# Patient Record
Sex: MALE | Race: WHITE
[De-identification: names, ages, dates, MRNs, and addresses within clinical notes are randomized per-mention and may not be internally consistent; named-entity substitution may affect disease eponyms.]

---

## 2017-06-15 ENCOUNTER — HOSPITAL ENCOUNTER (EMERGENCY)
Dept: HOSPITAL 26 - MED | Age: 42
Discharge: HOME | End: 2017-06-15
Payer: MEDICAID

## 2017-06-15 VITALS — DIASTOLIC BLOOD PRESSURE: 73 MMHG | SYSTOLIC BLOOD PRESSURE: 123 MMHG

## 2017-06-15 VITALS — HEIGHT: 65 IN | BODY MASS INDEX: 26.66 KG/M2 | WEIGHT: 160 LBS

## 2017-06-15 VITALS — SYSTOLIC BLOOD PRESSURE: 119 MMHG | DIASTOLIC BLOOD PRESSURE: 71 MMHG
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
## 2017-06-15 DIAGNOSIS — S39.012A: Primary | ICD-10-CM
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
## 2017-06-15 DIAGNOSIS — S80.02XA: ICD-10-CM
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
## 2017-06-15 NOTE — NUR
Patient discharged with v/s stable. Written and verbal after care instructions 
given and explained. 

Patient alert, oriented and verbalized understanding of instructions. 
Ambulatory with steady gait. All questions addressed prior to discharge. ID 
band removed. Patient advised to follow up with PMD. Rx of MOTRIN& ROBAXIN 
given. Patient educated on indication of medication including possible reaction 
and side effects. Opportunity to ask questions provided and answered.
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
## 2019-01-27 ENCOUNTER — HOSPITAL ENCOUNTER (EMERGENCY)
Dept: HOSPITAL 26 - MED | Age: 44
Discharge: HOME | End: 2019-01-27
Payer: MEDICAID

## 2019-01-27 VITALS — SYSTOLIC BLOOD PRESSURE: 129 MMHG | DIASTOLIC BLOOD PRESSURE: 84 MMHG

## 2019-01-27 VITALS — SYSTOLIC BLOOD PRESSURE: 131 MMHG | DIASTOLIC BLOOD PRESSURE: 90 MMHG

## 2019-01-27 VITALS — HEIGHT: 64 IN | WEIGHT: 156 LBS | BODY MASS INDEX: 26.63 KG/M2

## 2019-01-27 DIAGNOSIS — S50.02XA: Primary | ICD-10-CM
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
## 2019-01-27 DIAGNOSIS — Z79.1: ICD-10-CM

## 2019-01-27 PROCEDURE — 73080 X-RAY EXAM OF ELBOW: CPT

## 2019-01-27 PROCEDURE — 99283 EMERGENCY DEPT VISIT LOW MDM: CPT

## 2019-01-27 PROCEDURE — 96372 THER/PROPH/DIAG INJ SC/IM: CPT
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
## 2019-01-27 NOTE — NUR
Patient discharged with v/s stable. Written and verbal after care instructions 
given and explained. 

Patient alert, oriented and verbalized understanding of instructions. 
Ambulatory with steady gait. All questions addressed prior to discharge. ID 
band removed. Patient advised to follow up with PMD. Rx of Prednisone 50mg and 
Naprosyn 500mg given. Patient educated on indication of medication including 
possible reaction and side effects. Opportunity to ask questions provided and 
answered.

## 2020-01-27 ENCOUNTER — HOSPITAL ENCOUNTER (EMERGENCY)
Dept: HOSPITAL 26 - MED | Age: 45
Discharge: HOME | End: 2020-01-27
Payer: MEDICAID

## 2020-01-27 VITALS — DIASTOLIC BLOOD PRESSURE: 82 MMHG | SYSTOLIC BLOOD PRESSURE: 138 MMHG

## 2020-01-27 VITALS — BODY MASS INDEX: 23.9 KG/M2 | HEIGHT: 64 IN | WEIGHT: 140 LBS

## 2020-01-27 VITALS — SYSTOLIC BLOOD PRESSURE: 136 MMHG | DIASTOLIC BLOOD PRESSURE: 95 MMHG
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
## 2020-01-27 DIAGNOSIS — S05.12XA: Primary | ICD-10-CM
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
## 2020-01-27 DIAGNOSIS — H11.32: ICD-10-CM

## 2020-01-27 DIAGNOSIS — Z79.899: ICD-10-CM
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
## 2020-01-27 NOTE — NUR
Patient discharged with v/s stable. Pt states 2/10 tollerable pain. Written and 
verbal after care instructions given and explained. Patient alert, oriented and 
verbalized understanding of instructions. Ambulatory with steady gait. All 
questions addressed prior to discharge. ID band removed. Patient advised to 
follow up with PMD and when to return to ER. Rx of Ibuprofen given. Patient 
educated on indication of medication including possible reaction and side 
effects. Opportunity to ask questions provided and answered.
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
## 2020-09-05 ENCOUNTER — HOSPITAL ENCOUNTER (EMERGENCY)
Dept: HOSPITAL 26 - MED | Age: 45
Discharge: HOME | End: 2020-09-05
Payer: MEDICAID

## 2020-09-05 VITALS — DIASTOLIC BLOOD PRESSURE: 84 MMHG | SYSTOLIC BLOOD PRESSURE: 140 MMHG

## 2020-09-05 VITALS — WEIGHT: 160 LBS | HEIGHT: 62 IN | BODY MASS INDEX: 29.44 KG/M2

## 2020-09-05 DIAGNOSIS — J18.9: Primary | ICD-10-CM

## 2020-09-05 DIAGNOSIS — M79.10: ICD-10-CM

## 2020-09-05 DIAGNOSIS — Z79.899: ICD-10-CM

## 2020-09-05 LAB
ALBUMIN FLD-MCNC: 3 G/DL (ref 3.4–5)
ANION GAP SERPL CALCULATED.3IONS-SCNC: 14.5 MMOL/L (ref 8–16)
AST SERPL-CCNC: 12 U/L (ref 15–37)
BASOPHILS # BLD AUTO: 0 K/UL (ref 0–0.22)
BASOPHILS NFR BLD AUTO: 0.3 % (ref 0–2)
BILIRUB SERPL-MCNC: 0.7 MG/DL (ref 0–1)
BUN SERPL-MCNC: 15 MG/DL (ref 7–18)
CHLORIDE SERPL-SCNC: 106 MMOL/L (ref 98–107)
CO2 SERPL-SCNC: 24.9 MMOL/L (ref 21–32)
CREAT SERPL-MCNC: 0.7 MG/DL (ref 0.6–1.3)
EOSINOPHIL # BLD AUTO: 0.1 K/UL (ref 0–0.4)
EOSINOPHIL NFR BLD AUTO: 1.3 % (ref 0–4)
ERYTHROCYTE [DISTWIDTH] IN BLOOD BY AUTOMATED COUNT: 14.7 % (ref 11.6–13.7)
GFR SERPL CREATININE-BSD FRML MDRD: 156 ML/MIN (ref 90–?)
GLUCOSE SERPL-MCNC: 96 MG/DL (ref 74–106)
HCT VFR BLD AUTO: 36.4 % (ref 36–52)
HGB BLD-MCNC: 11.9 G/DL (ref 12–18)
LYMPHOCYTES # BLD AUTO: 1.2 K/UL (ref 2–11.5)
LYMPHOCYTES NFR BLD AUTO: 10.1 % (ref 20.5–51.1)
MCH RBC QN AUTO: 28 PG (ref 27–31)
MCHC RBC AUTO-ENTMCNC: 33 G/DL (ref 33–37)
MCV RBC AUTO: 87.3 FL (ref 80–94)
MONOCYTES # BLD AUTO: 0.9 K/UL (ref 0.8–1)
MONOCYTES NFR BLD AUTO: 7.6 % (ref 1.7–9.3)
NEUTROPHILS # BLD AUTO: 9.5 K/UL (ref 1.8–7.7)
NEUTROPHILS NFR BLD AUTO: 80.7 % (ref 42.2–75.2)
PLATELET # BLD AUTO: 280 K/UL (ref 140–450)
POTASSIUM SERPL-SCNC: 4.4 MMOL/L (ref 3.5–5.1)
RBC # BLD AUTO: 4.17 MIL/UL (ref 4.2–6.1)
SODIUM SERPL-SCNC: 141 MMOL/L (ref 136–145)
WBC # BLD AUTO: 11.7 K/UL (ref 4.8–10.8)

## 2020-09-05 PROCEDURE — 96361 HYDRATE IV INFUSION ADD-ON: CPT

## 2020-09-05 PROCEDURE — 96374 THER/PROPH/DIAG INJ IV PUSH: CPT

## 2020-09-05 PROCEDURE — 36415 COLL VENOUS BLD VENIPUNCTURE: CPT

## 2020-09-05 PROCEDURE — 94664 DEMO&/EVAL PT USE INHALER: CPT

## 2020-09-05 PROCEDURE — 87426 SARSCOV CORONAVIRUS AG IA: CPT

## 2020-09-05 PROCEDURE — 71045 X-RAY EXAM CHEST 1 VIEW: CPT

## 2020-09-05 PROCEDURE — 80053 COMPREHEN METABOLIC PANEL: CPT

## 2020-09-05 PROCEDURE — 85025 COMPLETE CBC W/AUTO DIFF WBC: CPT

## 2020-09-05 PROCEDURE — 84484 ASSAY OF TROPONIN QUANT: CPT

## 2020-09-05 PROCEDURE — 93005 ELECTROCARDIOGRAM TRACING: CPT

## 2020-09-05 PROCEDURE — 99285 EMERGENCY DEPT VISIT HI MDM: CPT

## 2020-09-05 NOTE — NUR
ASSESSMENT PERFORMED AT 1330. PATIENT COMPLAINS OF BODY ACHES AND SOME 
DIFFICULTY BREATHING X 2 DAYS. PT LUNGS WITH SOME WHEEZING BILATERAL. PT DENIES 
COUGH, NO FEVER AT TRIAGE. PT AOX4, BREATHING EVEN AND UNLABORED, SKIN WARM AND 
DRY. BED IN LOWEST POSITION, LOCKED, BED RAIL UPX1.



PMH - GOUT

## 2020-09-05 NOTE — NUR
Patient discharged with v/s stable. Written and verbal after care instructions 
about chest pain nonspecific given and explained with translation. 

Patient alert, oriented and verbalized understanding of instructions. 
Ambulatory with steady gait. All questions addressed prior to discharge. ID 
band removed. Patient advised to follow up with PMD. Rx of albuterol, 
augmentin, and azithromycin given. Patient educated on indication of medication 
including possible reaction and side effects. Opportunity to ask questions 
provided and answered.